# Patient Record
Sex: FEMALE | Race: WHITE | Employment: UNEMPLOYED | URBAN - METROPOLITAN AREA
[De-identification: names, ages, dates, MRNs, and addresses within clinical notes are randomized per-mention and may not be internally consistent; named-entity substitution may affect disease eponyms.]

---

## 2019-05-16 ENCOUNTER — OFFICE VISIT (OUTPATIENT)
Dept: FAMILY MEDICINE CLINIC | Age: 59
End: 2019-05-16
Payer: COMMERCIAL

## 2019-05-16 VITALS
HEIGHT: 65 IN | DIASTOLIC BLOOD PRESSURE: 74 MMHG | WEIGHT: 187 LBS | OXYGEN SATURATION: 100 % | HEART RATE: 65 BPM | BODY MASS INDEX: 31.16 KG/M2 | SYSTOLIC BLOOD PRESSURE: 124 MMHG

## 2019-05-16 DIAGNOSIS — Z13.220 SCREENING FOR HYPERLIPIDEMIA: ICD-10-CM

## 2019-05-16 DIAGNOSIS — Z00.00 HEALTHCARE MAINTENANCE: Primary | ICD-10-CM

## 2019-05-16 DIAGNOSIS — Z12.31 ENCOUNTER FOR SCREENING MAMMOGRAM FOR BREAST CANCER: ICD-10-CM

## 2019-05-16 DIAGNOSIS — Z23 NEED FOR PROPHYLACTIC VACCINATION AGAINST DIPHTHERIA-TETANUS-PERTUSSIS (DTP): ICD-10-CM

## 2019-05-16 PROCEDURE — 90471 IMMUNIZATION ADMIN: CPT | Performed by: FAMILY MEDICINE

## 2019-05-16 PROCEDURE — 90715 TDAP VACCINE 7 YRS/> IM: CPT | Performed by: FAMILY MEDICINE

## 2019-05-16 PROCEDURE — 99386 PREV VISIT NEW AGE 40-64: CPT | Performed by: FAMILY MEDICINE

## 2019-05-16 RX ORDER — M-VIT,TX,IRON,MINS/CALC/FOLIC 27MG-0.4MG
1 TABLET ORAL DAILY
COMMUNITY

## 2019-05-16 ASSESSMENT — PATIENT HEALTH QUESTIONNAIRE - PHQ9
2. FEELING DOWN, DEPRESSED OR HOPELESS: 0
SUM OF ALL RESPONSES TO PHQ9 QUESTIONS 1 & 2: 0
SUM OF ALL RESPONSES TO PHQ QUESTIONS 1-9: 0
1. LITTLE INTEREST OR PLEASURE IN DOING THINGS: 0
SUM OF ALL RESPONSES TO PHQ QUESTIONS 1-9: 0

## 2019-05-16 NOTE — PATIENT INSTRUCTIONS
Ways to Prevent Osteoporosis     What Is Osteoporosis? Osteoporosis is a disease of the bones in which the bones become so weak that they break easily. Bones are made up of living tissue that is constantly being renewed. This process, called remodeling, consists of two stagesbone resorption and bone formation. During resorption, old bone is broken down and removed. During bone formation, new bone is built to replace the old. The remodeling process changes naturally throughout the life cycle. During childhood and early adulthood, new bone is formed faster than old bone is removed. Between ages 22 and 28, a peak bone mass (maximum density and strength) is reached. After this, bone loss starts to occur more than bone formation. In women, the rate of loss is greatest during the years after menopause . Osteoporosis occurs when there is an excessive amount of bone loss and/or insufficient bone formation. The bones become thin and weak, increasing the chance of fractures . Fractures are most common in the hip, spine, wrist, and ribs, but can occur in any bone. These fractures can result in trouble walking, severe pain, loss of height, spinal deformities, and decreased function. Because bone loss occurs without symptoms, people may not realize they have osteoporosis until a sudden bump or fall causes a fracture. Bone density tests may help predict who is at the greatest risk. Prevention at All Ages   Since this condition occurs mainly in older people, why should you be concerned about it during earlier years? Prevention of osteoporosis can begin in childhood, when bone mass is increasing. Diet, exercise, smoking , and use of alcohol and caffeine all affect bone formation throughout life. Preventive measures are also important when bone mass is decreasing, during midlife and just after menopause in women.    Calcium and Vitamin D   Good nutrition, especially an adequate supply of calcium , plays an important part in maintaining bone mass. Vitamin D and magnesium are also needed to aid in calcium absorption. Although you must work at it, it is possible to get adequate amounts of calcium from your diet. Dairy products are the best dietary sources of calcium. Other good sources include tofu, salmon (canned with edible bones), and blackstrap molasses. If you cannot or do not regularly get enough calcium from your diet, you may need to take a calcium supplement. However, supplements should be used only to supplement the calcium in your diet, not replace it. There are several different calcium compounds on the market. They differ mainly in price and how easily they are absorbed. Be sure to discuss calcium supplementation with your doctor. General recommendations are:   · Women   ¨ 19-50     1,000 milligrams (mg)   ¨ 51 and older    1,200 mg   · Men   ¨ 19-50    1,000 mg   ¨ 51-70    1,000 mg   ¨ Over 70    1,200 mg   Vitamin D is also important. . Our usual source of vitamin D is sunlight. But, with so many of us spending abundant time indoors (and wisely using sunscreen when outdoors), few people get adequate sun exposureespecially during the wintertime. [place] and other types of fish (like mackeral and sardines) are good vitamin D sources. Also, adding vitamin D-fortified milk to your diet will help you meet your daily needs. . The recommendation for most people (aged 1-70 years) is to get 600 international units (IU) of vitamin D each day. If you want to check on your vitamin D status, talk to your doctor, who will be able to do a blood test.   Exercise   Exercise is an important contributor to building and maintaining bone mass at all ages. It also increases the strength and coordination of muscles that support the bones. Weight-bearing exercise , such as walking , jogging, stair climbing, jumping rope, and dancing, is the best for your bones.  Weight lifting has also been shown to help strengthen bones and prevent osteoporosis. If you are unaccustomed to exercising, talk to your doctor before you begin. Other Lifestyle Factors   Smoking, and alcohol can contribute to bone loss. To reduce your risk, do not smoke, and limit your use of alcohol. Smoking is a very serious risk factor for osteoporosis and should be avoided by anyone seeking to reduce the risk of bone thinning. Also being underweight can increase your risk for osteoporosis   Medications for Prevention and Treatment   For women who are postmenopausal, there are medicines available to prevent osteoporosis. Some examples include:   · Bisphosphonates, like alendronate (Fosamax)   · Estrogen with progestin (hormones)   · Raloxifene (Evista)   Your Prevention Strategy   There is general agreement that everyone can reduce their risk of developing osteoporosis by making lifestyle changes. A healthy diet and regular exercise are important throughout your life. Avoiding smoking and limiting alcohol use are two of the most important prevention strategies you can adopt. Women from midlife on, older men, and anyone else at increased risk for osteoporosis should evaluate their risk factors in order to develop a prevention strategy. How you implement lifestyle changes and whether you take medicines are decisions that should be made by you and your doctor.      Last Reviewed: February 2011 Darinel Cordero MD   Updated: 2/17/2011 ;

## 2019-05-16 NOTE — PROGRESS NOTES
Zaira Steven   YOB: 1960    Date of Visit:  2019        No Known Allergies  Outpatient Medications Marked as Taking for the 19 encounter (Office Visit) with Sarabjit Lujan MD   Medication Sig Dispense Refill    Multiple Vitamins-Minerals (THERAPEUTIC MULTIVITAMIN-MINERALS) tablet Take 1 tablet by mouth daily      vitamin D (CHOLECALCIFEROL) 1000 UNIT TABS tablet Take 1,000 Units by mouth daily           Vitals:    19 1202   BP: 124/74   Site: Right Upper Arm   Position: Sitting   Cuff Size: Medium Adult   Pulse: 65   SpO2: 100%   Weight: 187 lb (84.8 kg)   Height: 5' 4.5\" (1.638 m)     Body mass index is 31.6 kg/m². Wt Readings from Last 3 Encounters:   19 187 lb (84.8 kg)     BP Readings from Last 3 Encounters:   19 124/74        Chief Complaint   Patient presents with    New Patient     moved from [facility] Exam     not fasting     Colonoscopy     referral        HPI    Juan Jose Benz presents to establish care. she has the following concerns today:    Obesity:  Weight flucates. Highest weight was 180. Now headed back down. Logging food which has helped her. Hx of SVT: s/p ablation in . No issues since the ablation. SH: moved from [place] [address] 2018 for cost of living. 4 kids are in [place] [address]. Extended family is here. Not working. History reviewed. No pertinent past medical history.     Past Surgical History:   Procedure Laterality Date     SECTION      x 4    TUBAL LIGATION          VENTRICULAR ABLATION SURGERY         Social History     Socioeconomic History    Marital status:      Spouse name: Not on file    Number of children: Not on file    Years of education: Not on file    Highest education level: Not on file   Occupational History    Not on file   Social Needs    Financial resource strain: Not on file    Food insecurity:     Worry: Not on file     Inability: Not on file    Transportation needs:     Medical: Not on file     Non-medical: Not on file   Tobacco Use    Smoking status: Never Smoker    Smokeless tobacco: Never Used   Substance and Sexual Activity    Alcohol use: Yes     Alcohol/week: 4.2 oz     Types: 4 Glasses of wine, 3 Cans of beer per week     Comment: social     Drug use: Never    Sexual activity: Not on file   Lifestyle    Physical activity:     Days per week: Not on file     Minutes per session: Not on file    Stress: Not on file   Relationships    Social connections:     Talks on phone: Not on file     Gets together: Not on file     Attends Hoahaoism service: Not on file     Active member of club or organization: Not on file     Attends meetings of clubs or organizations: Not on file     Relationship status: Not on file    Intimate partner violence:     Fear of current or ex partner: Not on file     Emotionally abused: Not on file     Physically abused: Not on file     Forced sexual activity: Not on file   Other Topics Concern    Not on file   Social History Narrative    Not on file       Family History   Problem Relation Age of Onset    Heart Disease Mother     Stroke Mother     Diabetes Sister     Cancer Maternal Aunt          Review of Systems  Complete review of systems negative except as documented in the HPI. Physical Exam   Constitutional: She is oriented to person, place, and time. She appears well-developed and well-nourished. No distress. HENT:   Head: Normocephalic and atraumatic. Right Ear: Tympanic membrane, external ear and ear canal normal.   Left Ear: Tympanic membrane, external ear and ear canal normal.   Mouth/Throat: Oropharynx is clear and moist.   Eyes: Pupils are equal, round, and reactive to light. Conjunctivae and EOM are normal.   Neck: Normal range of motion. Neck supple. No thyromegaly present. Cardiovascular: Normal rate, regular rhythm and normal heart sounds. No murmur heard.   Pulmonary/Chest: Effort normal and breath sounds normal. No

## 2019-05-20 LAB
HPV COMMENT: NORMAL
HPV TYPE 16: NOT DETECTED
HPV TYPE 18: NOT DETECTED
HPVOH (OTHER TYPES): NOT DETECTED

## 2019-05-29 DIAGNOSIS — Z12.11 COLON CANCER SCREENING: Primary | ICD-10-CM

## 2019-05-29 DIAGNOSIS — Z13.220 SCREENING FOR HYPERLIPIDEMIA: ICD-10-CM

## 2019-05-29 DIAGNOSIS — Z00.00 HEALTHCARE MAINTENANCE: ICD-10-CM

## 2019-05-29 LAB
ANION GAP SERPL CALCULATED.3IONS-SCNC: 12 MMOL/L (ref 3–16)
BUN BLDV-MCNC: 9 MG/DL (ref 7–20)
CALCIUM SERPL-MCNC: 9.4 MG/DL (ref 8.3–10.6)
CHLORIDE BLD-SCNC: 99 MMOL/L (ref 99–110)
CHOLESTEROL, FASTING: 239 MG/DL (ref 0–199)
CO2: 27 MMOL/L (ref 21–32)
CONTROL: NORMAL
CREAT SERPL-MCNC: 0.6 MG/DL (ref 0.6–1.1)
GFR AFRICAN AMERICAN: >60
GFR NON-AFRICAN AMERICAN: >60
GLUCOSE BLD-MCNC: 96 MG/DL (ref 70–99)
HDLC SERPL-MCNC: 87 MG/DL (ref 40–60)
HEMOCCULT STL QL: NORMAL
LDL CHOLESTEROL CALCULATED: 133 MG/DL
POTASSIUM SERPL-SCNC: 4.4 MMOL/L (ref 3.5–5.1)
SODIUM BLD-SCNC: 138 MMOL/L (ref 136–145)
TRIGLYCERIDE, FASTING: 94 MG/DL (ref 0–150)
VLDLC SERPL CALC-MCNC: 19 MG/DL

## 2019-05-29 PROCEDURE — 82274 ASSAY TEST FOR BLOOD FECAL: CPT | Performed by: FAMILY MEDICINE

## 2019-08-02 ENCOUNTER — OFFICE VISIT (OUTPATIENT)
Dept: FAMILY MEDICINE CLINIC | Age: 59
End: 2019-08-02
Payer: COMMERCIAL

## 2019-08-02 VITALS
HEIGHT: 65 IN | BODY MASS INDEX: 31.75 KG/M2 | OXYGEN SATURATION: 97 % | DIASTOLIC BLOOD PRESSURE: 80 MMHG | HEART RATE: 75 BPM | SYSTOLIC BLOOD PRESSURE: 116 MMHG | WEIGHT: 190.6 LBS

## 2019-08-02 DIAGNOSIS — S76.301A RIGHT HAMSTRING INJURY, INITIAL ENCOUNTER: Primary | ICD-10-CM

## 2019-08-02 PROCEDURE — 99213 OFFICE O/P EST LOW 20 MIN: CPT | Performed by: NURSE PRACTITIONER

## 2019-08-02 ASSESSMENT — ENCOUNTER SYMPTOMS: SHORTNESS OF BREATH: 0

## 2019-08-02 NOTE — PROGRESS NOTES
[facility]  [address] [place] [address]  Jessica Melendez [address]: [phone]         2019     Nelsy Avendano (:  1960) is a 62 y.o. female, here for evaluation of the following medical concerns:    Chief Complaint   Patient presents with    Muscle Pain     happened on , right leg         HPI   Hamstring injury  On  did the splits going into the water  Hamstring hurts on back and center  No numbness or tingling  Feels a little tight  Now not throbbing  Denies weakness  Uncomfortable with sitting  Can walk  Bruise present  Iced x 1-2 days  Took Aleve a couple days in a row- helped    Review of Systems   Constitutional: Positive for activity change. Negative for appetite change, fatigue and fever. Respiratory: Negative for shortness of breath. Cardiovascular: Negative for chest pain. Musculoskeletal:        Hamstring injury   Neurological: Negative for dizziness, weakness and headaches. Psychiatric/Behavioral: Negative for sleep disturbance. Prior to Visit Medications    Medication Sig Taking? Authorizing Provider   Multiple Vitamins-Minerals (THERAPEUTIC MULTIVITAMIN-MINERALS) tablet Take 1 tablet by mouth daily Yes Historical Provider, MD   vitamin D (CHOLECALCIFEROL) 1000 UNIT TABS tablet Take 1,000 Units by mouth daily Yes Historical Provider, MD        Social History     Tobacco Use    Smoking status: Never Smoker    Smokeless tobacco: Never Used   Substance Use Topics    Alcohol use: Yes     Alcohol/week: 7.0 standard drinks     Types: 4 Glasses of wine, 3 Cans of beer per week     Comment: social         Vitals:    19 1043   BP: 116/80   Site: Left Upper Arm   Position: Sitting   Cuff Size: Large Adult   Pulse: 75   SpO2: 97%   Weight: 190 lb 9.6 oz (86.5 kg)   Height: 5' 4.5\" (1.638 m)     Estimated body mass index is 32.21 kg/m² as calculated from the following:    Height as of this encounter: 5' 4.5\" (1.638 m).     Weight